# Patient Record
Sex: MALE | Race: WHITE | NOT HISPANIC OR LATINO | URBAN - METROPOLITAN AREA
[De-identification: names, ages, dates, MRNs, and addresses within clinical notes are randomized per-mention and may not be internally consistent; named-entity substitution may affect disease eponyms.]

---

## 2022-09-06 ENCOUNTER — OFFICE VISIT (OUTPATIENT)
Dept: FAMILY MEDICINE | Facility: CLINIC | Age: 27
End: 2022-09-06
Payer: COMMERCIAL

## 2022-09-06 VITALS
SYSTOLIC BLOOD PRESSURE: 139 MMHG | HEART RATE: 66 BPM | RESPIRATION RATE: 16 BRPM | WEIGHT: 178.6 LBS | OXYGEN SATURATION: 95 % | TEMPERATURE: 98.1 F | DIASTOLIC BLOOD PRESSURE: 81 MMHG

## 2022-09-06 DIAGNOSIS — R09.A2 GLOBUS SENSATION: Primary | ICD-10-CM

## 2022-09-06 PROCEDURE — 99202 OFFICE O/P NEW SF 15 MIN: CPT | Performed by: PHYSICIAN ASSISTANT

## 2022-09-06 ASSESSMENT — ENCOUNTER SYMPTOMS
SHORTNESS OF BREATH: 0
WHEEZING: 0
TROUBLE SWALLOWING: 0
SORE THROAT: 1

## 2022-09-06 NOTE — LETTER
September 6, 2022      Can Tillman  [address] [id number]        To Whom It May Concern:    Can Tillman was seen in our clinic. He may return to work on 9/7/22 without restrictions.      Sincerely,        Mary Freeman PA-C

## 2022-09-06 NOTE — PATIENT INSTRUCTIONS
1. Seek emergency medical attention if there are signs of worsening reaction: lip/tongue/throat swelling, vomiting, facial swelling, or difficulty breathing.   2. Take Tylenol and/or Ibuprofen as needed for pain.

## 2022-09-06 NOTE — PROGRESS NOTES
Patient presents with:  Insect Bite: X yesterday. Bee stung inner throat. Pt states feel stinger in throat. Little sore.      Clinical Decision Making:  Patient reports inhaling/swallowing a bee.  Physical exam is benign.  No findings concerning for anaphylaxis.  Patient was informed that there is very little that can be done at this time.  Recommend Tylenol and ibuprofen as needed for pain control.  Patient was given reasons to seek emergency medical attention.      ICD-10-CM    1. Globus sensation  R19.8        Patient Instructions   1. Seek emergency medical attention if there are signs of worsening reaction: lip/tongue/throat swelling, vomiting, facial swelling, or difficulty breathing.   2. Take Tylenol and/or Ibuprofen as needed for pain.           HPI:  Can Tillman is a 26 year old male who presents today complaining of accidentally inhaling/swallowing a bee.  He feels like there is a bee sting in his throat.  He denies any difficulty swallowing or breathing.  He does has a foreign body sensation in the back of his throat.    History obtained from the patient.    Problem List:  2008-01: Achilles bursitis or tendinitis      No past medical history on file.    Social History     Tobacco Use     Smoking status: Never Smoker     Smokeless tobacco: Never Used   Substance Use Topics     Alcohol use: Not on file       Review of Systems   HENT: Positive for sore throat. Negative for trouble swallowing.    Respiratory: Negative for shortness of breath and wheezing.        Vitals:    09/06/22 1507   BP: 139/81   BP Location: Left arm   Patient Position: Sitting   Cuff Size: Adult Regular   Pulse: 66   Resp: 16   Temp: 98.1  F (36.7  C)   TempSrc: Oral   SpO2: 95%   Weight: 81 kg (178 lb 9.6 oz)       Physical Exam  Vitals and nursing note reviewed.   Constitutional:       General: He is not in acute distress.     Appearance: He is not toxic-appearing or diaphoretic.   HENT:      Head: Normocephalic and  atraumatic.      Right Ear: External ear normal.      Left Ear: External ear normal.      Mouth/Throat:      Mouth: Mucous membranes are moist.      Pharynx: No oropharyngeal exudate or posterior oropharyngeal erythema.   Eyes:      Conjunctiva/sclera: Conjunctivae normal.   Cardiovascular:      Rate and Rhythm: Normal rate and regular rhythm.      Heart sounds: No murmur heard.  Pulmonary:      Effort: Pulmonary effort is normal. No respiratory distress.      Breath sounds: No stridor. No wheezing, rhonchi or rales.   Neurological:      Mental Status: He is alert.   Psychiatric:         Mood and Affect: Mood normal.         Behavior: Behavior normal.         Thought Content: Thought content normal.         Judgment: Judgment normal.             At the end of the encounter, I discussed results, diagnosis, medications. Discussed red flags for immediate return to clinic/ER, as well as indications for follow up if no improvement. Patient understood and agreed to plan. Patient was stable for discharge.